# Patient Record
Sex: MALE | Race: AMERICAN INDIAN OR ALASKA NATIVE
[De-identification: names, ages, dates, MRNs, and addresses within clinical notes are randomized per-mention and may not be internally consistent; named-entity substitution may affect disease eponyms.]

---

## 2017-10-17 ENCOUNTER — HOSPITAL ENCOUNTER (EMERGENCY)
Dept: HOSPITAL 5 - ED | Age: 32
Discharge: HOME | End: 2017-10-17
Payer: SELF-PAY

## 2017-10-17 VITALS — DIASTOLIC BLOOD PRESSURE: 72 MMHG | SYSTOLIC BLOOD PRESSURE: 111 MMHG

## 2017-10-17 DIAGNOSIS — Z21: ICD-10-CM

## 2017-10-17 DIAGNOSIS — N39.0: Primary | ICD-10-CM

## 2017-10-17 DIAGNOSIS — F17.200: ICD-10-CM

## 2017-10-17 LAB
BILIRUB UR QL STRIP: (no result)
BLOOD UR QL VISUAL: (no result)
KETONES UR STRIP-MCNC: (no result) MG/DL
LEUKOCYTE ESTERASE UR QL STRIP: (no result)
MUCOUS THREADS #/AREA URNS HPF: (no result) /HPF
NITRITE UR QL STRIP: (no result)
PH UR STRIP: 5 [PH] (ref 5–7)
PROT UR STRIP-MCNC: (no result) MG/DL
RBC #/AREA URNS HPF: 7 /HPF (ref 0–6)
UROBILINOGEN UR-MCNC: 2 MG/DL (ref ?–2)
WBC #/AREA URNS HPF: 19 /HPF (ref 0–6)

## 2017-10-17 PROCEDURE — 99283 EMERGENCY DEPT VISIT LOW MDM: CPT

## 2017-10-17 PROCEDURE — 81001 URINALYSIS AUTO W/SCOPE: CPT

## 2017-10-17 NOTE — EMERGENCY DEPARTMENT REPORT
ED Male  HPI





- General


Chief complaint: Urogenital-Male


Stated complaint: URO MALE


Time Seen by Provider: 10/17/17 17:11


Source: patient


Mode of arrival: Ambulatory


Limitations: No Limitations





- History of Present Illness


Initial comments: 





This is a 32-year-old male nontoxic, well nourished in appearance, no acute 

signs of distress presents to the ED complaining of dysuria and polyuria 2 

days. Patient denies hematuria, back pain, headache, stiff neck, chest pain, 

shortness of breathe, fever, chills, n/v, numbness, or tingling. Patient denies 

any penile discharge or testicular pain. Denies any allergies. PMH includes 

HIV. Patient stated he does follow-up with a infection disease doctor and is 

currently having normal CD4 count.


MD Complaint: dysuria


-: Gradual, days(s) (2)


Radiation: none


Severity: mild


Severity scale (0 -10): 4


Quality: burning


Consistency: constant


Improves with: none


Worsens with: urination


dysuria.  denies: discharge, swelling, mass, rash, urinary retention, blood in 

urine, fever, nausea/vomiting, incontinence





- Related Data


 Previous Rx's











 Medication  Instructions  Recorded  Last Taken  Type


 


Levofloxacin [Levaquin TAB] 750 mg PO QDAY #7 tablet 10/17/17 Unknown Rx











 Allergies











Allergy/AdvReac Type Severity Reaction Status Date / Time


 


No Known Allergies Allergy   Unverified 08/16/16 20:45














ED Review of Systems


ROS: 


Stated complaint: URO MALE


Other details as noted in HPI





Constitutional: denies: chills, fever


Eyes: denies: eye pain, eye discharge, vision change


ENT: denies: ear pain, throat pain


Respiratory: denies: cough, shortness of breath, wheezing


Cardiovascular: denies: chest pain, palpitations


Endocrine: no symptoms reported


Gastrointestinal: denies: abdominal pain, nausea, diarrhea


Genitourinary: dysuria, frequency.  denies: urgency


Musculoskeletal: denies: back pain, joint swelling, arthralgia


Skin: denies: rash, lesions


Neurological: denies: headache, weakness, paresthesias


Psychiatric: denies: anxiety, depression


Hematological/Lymphatic: denies: easy bleeding, easy bruising





ED Past Medical Hx





- Past Medical History


Previous Medical History?: Yes


Hx HIV: Yes





- Surgical History


Past Surgical History?: No





- Social History


Smoking Status: Current Every Day Smoker


Substance Use Type: None





- Medications


Home Medications: 


 Home Medications











 Medication  Instructions  Recorded  Confirmed  Last Taken  Type


 


Levofloxacin [Levaquin TAB] 750 mg PO QDAY #7 tablet 10/17/17  Unknown Rx














ED Physical Exam





- General


Limitations: No Limitations


General appearance: alert, in no apparent distress





- Head


Head exam: Present: atraumatic, normocephalic, normal inspection





- Eye


Eye exam: Present: normal appearance, PERRL, EOMI.  Absent: scleral icterus, 

conjunctival injection, nystagmus, periorbital swelling, periorbital tenderness


Pupils: Present: normal accommodation





- ENT


ENT exam: Present: normal exam, normal orophraynx, mucous membranes moist, TM's 

normal bilaterally, normal external ear exam





- Neck


Neck exam: Present: normal inspection, full ROM.  Absent: tenderness, 

meningismus, lymphadenopathy, thyromegaly





- Respiratory


Respiratory exam: Present: normal lung sounds bilaterally.  Absent: respiratory 

distress, wheezes, rales, rhonchi, stridor, chest wall tenderness, accessory 

muscle use, decreased breath sounds, prolonged expiratory





- Cardiovascular


Cardiovascular Exam: Present: regular rate, normal rhythm, normal heart sounds.

  Absent: bradycardia, tachycardia, irregular rhythm, systolic murmur, 

diastolic murmur, rubs, gallop





- GI/Abdominal


GI/Abdominal exam: Present: soft, normal bowel sounds.  Absent: distended, 

tenderness, guarding, rebound, rigid, diminished bowel sounds





- Rectal


Rectal exam: Present: deferred





- 


 exam: Present: normal inspection.  Absent: testicular tenderness, urethral 

discharge, scrotal swelling, vertical testicular lie


External exam: Present: normal external exam.  Absent: erythema, swelling, 

lesions, lacerations, ecchymosis, bleeding





- Extremities Exam


Extremities exam: Present: normal inspection, full ROM, normal capillary 

refill.  Absent: tenderness, pedal edema, joint swelling, calf tenderness





- Back Exam


Back exam: Present: normal inspection, full ROM.  Absent: tenderness, CVA 

tenderness (R), CVA tenderness (L), muscle spasm, paraspinal tenderness, 

vertebral tenderness, rash noted





- Neurological Exam


Neurological exam: Present: alert, oriented X3, CN II-XII intact, normal gait, 

reflexes normal





- Psychiatric


Psychiatric exam: Present: normal affect, normal mood





- Skin


Skin exam: Present: warm, dry, intact, normal color.  Absent: rash





ED Course





 Vital Signs











  10/17/17





  16:03


 


Temperature 98.6 F


 


Pulse Rate 62


 


Respiratory 16





Rate 


 


Blood Pressure 110/84


 


O2 Sat by Pulse 99





Oximetry 














- Reevaluation(s)


Reevaluation #1: 





10/17/17 18:12


Patient is speaking in full sentences with no signs of distress noted.





ED Medical Decision Making





- Medical Decision Making





32-year-old male that presents with UTI.  Patient is stable and was examined by 

me. There is no CVA tenderness or signs/symptoms of pyelonephritis.  Due to 

patient having immunocompromised HIV patient received levofloxacin 750 mg daily 

for 7 days. Patient was instructed to follow-up with a primary care doctor in 3-

5 days or if symptoms worsen and continue return to emergency room as soon as 

possible possible. Patient is hemodynamically  stable with stable vital signs. 

Patient states he is feeling better.  At time time of discharge, the patient 

does not seem toxic or ill in appearance.  No acute signs of distress noted.  

Patient agrees to discharge treatment plan of care.  No further questions noted 

by the patient.


Critical care attestation.: 


If time is entered above; I have spent that time in minutes in the direct care 

of this critically ill patient, excluding procedure time.








ED Disposition


Clinical Impression: 


UTI (urinary tract infection)


Qualifiers:


 Urinary tract infection type: site unspecified Hematuria presence: without 

hematuria Qualified Code(s): N39.0 - Urinary tract infection, site not specified





Disposition: DC-01 TO HOME OR SELFCARE


Is pt being admited?: No


Does the pt Need Aspirin: No


Condition: Stable


Instructions:  Urinary Tract Infection in Men (ED), Levofloxacin (By mouth)


Additional Instructions: 


Follow-up with a primary care doctor in 3-5 days or if symptoms worsen and 

continue return to emergency room as soon as possible possible.


Prescriptions: 


Levofloxacin [Levaquin TAB] 750 mg PO QDAY #7 tablet


Referrals: 


PILAR NARVAEZ MD [Primary Care Provider] - 3-5 Days


ELIZABETH NIX MD [Staff Physician] - 3-5 Days


[facility] [Outside] - 3-5 Days


[facility] [Outside] - 3-5 Days


Forms:  Work/School Release Form(ED)

## 2018-03-10 ENCOUNTER — HOSPITAL ENCOUNTER (EMERGENCY)
Dept: HOSPITAL 5 - ED | Age: 33
Discharge: HOME | End: 2018-03-10
Payer: SELF-PAY

## 2018-03-10 VITALS — DIASTOLIC BLOOD PRESSURE: 71 MMHG | SYSTOLIC BLOOD PRESSURE: 112 MMHG

## 2018-03-10 DIAGNOSIS — R42: ICD-10-CM

## 2018-03-10 DIAGNOSIS — R11.2: ICD-10-CM

## 2018-03-10 DIAGNOSIS — R10.9: Primary | ICD-10-CM

## 2018-03-10 LAB
ALBUMIN SERPL-MCNC: 4.4 G/DL (ref 3.9–5)
ALT SERPL-CCNC: 16 UNITS/L (ref 7–56)
BILIRUB UR QL STRIP: (no result)
BLOOD UR QL VISUAL: (no result)
BUN SERPL-MCNC: 14 MG/DL (ref 9–20)
BUN/CREAT SERPL: 13 %
CALCIUM SERPL-MCNC: 9 MG/DL (ref 8.4–10.2)
HCT VFR BLD CALC: 44.9 % (ref 35.5–45.6)
HEMOLYSIS INDEX: 14
HGB BLD-MCNC: 14.4 GM/DL (ref 11.8–15.2)
INR PPP: 0.99 (ref 0.87–1.13)
MCH RBC QN AUTO: 31 PG (ref 28–32)
MCHC RBC AUTO-ENTMCNC: 32 % (ref 32–34)
MCV RBC AUTO: 96 FL (ref 84–94)
MUCOUS THREADS #/AREA URNS HPF: (no result) /HPF
PH UR STRIP: 6 [PH] (ref 5–7)
PLATELET # BLD: 224 K/MM3 (ref 140–440)
PROT UR STRIP-MCNC: (no result) MG/DL
RBC # BLD AUTO: 4.7 M/MM3 (ref 3.65–5.03)
RBC #/AREA URNS HPF: 2 /HPF (ref 0–6)
UROBILINOGEN UR-MCNC: 4 MG/DL (ref ?–2)
WBC #/AREA URNS HPF: 1 /HPF (ref 0–6)

## 2018-03-10 PROCEDURE — 99284 EMERGENCY DEPT VISIT MOD MDM: CPT

## 2018-03-10 PROCEDURE — 93005 ELECTROCARDIOGRAM TRACING: CPT

## 2018-03-10 PROCEDURE — 81001 URINALYSIS AUTO W/SCOPE: CPT

## 2018-03-10 PROCEDURE — 74177 CT ABD & PELVIS W/CONTRAST: CPT

## 2018-03-10 PROCEDURE — 87591 N.GONORRHOEAE DNA AMP PROB: CPT

## 2018-03-10 PROCEDURE — 85027 COMPLETE CBC AUTOMATED: CPT

## 2018-03-10 PROCEDURE — 85610 PROTHROMBIN TIME: CPT

## 2018-03-10 PROCEDURE — 36415 COLL VENOUS BLD VENIPUNCTURE: CPT

## 2018-03-10 PROCEDURE — 96374 THER/PROPH/DIAG INJ IV PUSH: CPT

## 2018-03-10 PROCEDURE — 93010 ELECTROCARDIOGRAM REPORT: CPT

## 2018-03-10 PROCEDURE — 96375 TX/PRO/DX INJ NEW DRUG ADDON: CPT

## 2018-03-10 PROCEDURE — 96361 HYDRATE IV INFUSION ADD-ON: CPT

## 2018-03-10 PROCEDURE — 80053 COMPREHEN METABOLIC PANEL: CPT

## 2018-03-10 NOTE — CAT SCAN REPORT
FINAL REPORT



PROCEDURE:  CT ABDOMEN PELVIS W CON



TECHNIQUE:  Computerized axial tomography of the abdomen and

pelvis was performed after the IV injection of iodinated nonionic

contrast. 



HISTORY:  rlq pain, IV and oral contrast 



COMPARISON:  No prior studies are available for comparison.



FINDINGS:  

Lower Lung fields: Lung bases are suboptimally visualized due to

breathing motion artifact. There appears to be a small amount of

peripheral emphysematous change present.



Upper Abdomen: No focal liver lesions are identified.

Intrahepatic ducts are not distended. The gallbladder showed no

abnormalities. The adrenal glands, the pancreas and the spleen

are unremarkable. 



Kidneys, Ureters and Urinary bladder: There are few low-density

nodules in the periphery of the renal cortex measuring under 5

millimeters which are difficult to characterize given their small

size although appear to represent renal cortical cysts. Kidneys

ureters and urinary bladder otherwise are unremarkable. 



Retroperitoneum: Abdominal aorta appears normal.



Nonspecific subcentimeter lymph nodes are seen in the

retroperitoneum. No pathologically enlarged lymph nodes are

identified. 



Bowel: Bowel loops are unremarkable. No evidence of bowel

obstruction ascites or free intraperitoneal gas. The cecum

projects into right side of the pelvis. The appendix appears to

be visualized in the mid pelvis to the right of midline and is

unremarkable. 



Reproductive organs: Prostate gland does not appear to be

enlarged.



Other: No acute bony abnormalities are identified.



IMPRESSION:  

Small peripheral renal cortical cysts appear to be visualized.



No other abnormalities are identified.



No abnormalities are seen in the right lower quadrant. The

appendix does not appear to be inflamed.

## 2018-03-10 NOTE — EMERGENCY DEPARTMENT REPORT
ED Chest Pain HPI





- General


Chief Complaint: Nausea/Vomiting/Diarrhea


Stated Complaint: ABDOMINAL PAIN/VOMITING


Time Seen by Provider: 03/10/18 17:40


Source: patient


Mode of arrival: Ambulatory


Limitations: No Limitations





- History of Present Illness


MD Complaint: chest pain





- Related Data


 Previous Rx's











 Medication  Instructions  Recorded  Last Taken  Type


 


Levofloxacin [Levaquin TAB] 750 mg PO QDAY #7 tablet 10/17/17 Unknown Rx











 Allergies











Allergy/AdvReac Type Severity Reaction Status Date / Time


 


No Known Allergies Allergy   Unverified 08/16/16 20:45














ED Review of Systems


ROS: 


Stated complaint: ABDOMINAL PAIN/VOMITING


Other details as noted in HPI








ED Past Medical Hx





- Past Medical History


Hx HIV: Yes





- Social History


Smoking Status: Never Smoker


Substance Use Type: None





- Medications


Home Medications: 


 Home Medications











 Medication  Instructions  Recorded  Confirmed  Last Taken  Type


 


Levofloxacin [Levaquin TAB] 750 mg PO QDAY #7 tablet 10/17/17  Unknown Rx














ED Physical Exam





- General


Limitations: No Limitations





ED Course





 Vital Signs











  03/10/18





  17:15


 


Temperature 97.1 F L


 


Pulse Rate 66


 


Respiratory 16





Rate 


 


Blood Pressure 113/69


 


O2 Sat by Pulse 98





Oximetry 











Critical care attestation.: 


If time is entered above; I have spent that time in minutes in the direct care 

of this critically ill patient, excluding procedure time.








ED Disposition


Condition: Stable


Referrals: 


PRIMARY CARE,MD [Primary Care Provider] - 3-5 Days

## 2018-03-10 NOTE — EMERGENCY DEPARTMENT REPORT
HPI





- General


Chief Complaint: Nausea/Vomiting/Diarrhea


Time Seen by Provider: 03/10/18 17:40





- HPI


HPI: 





32-year-old -American male with a past medical history of HIV comes in 

today for complaint of abdominal pain and vomiting.  Patient reports that the 

pain and vomiting started today while at work.  Patient reports that the pain 

is suprapubic and it burns at times.  Patient reported that he vomited 2 while 

at work.  He reported that he was lightheaded.  Patient reports that he turned 

quickly he had dizziness and lightheadedness.  This was 2 times.  Patient 

denies any fever no chills no dysuria no penile discharge.  Patient is followed 

by [facility] in [place].  He has had no opportunistic infections he takes 

his medications on a daily basis.  He has had no recent hospitalizations.  He 

does not travel outside the country the last 30 days.





ED Past Medical Hx





- Past Medical History


Hx HIV: Yes





- Social History


Smoking Status: Never Smoker


Substance Use Type: None





- Medications


Home Medications: 


 Home Medications











 Medication  Instructions  Recorded  Confirmed  Last Taken  Type


 


Levofloxacin [Levaquin TAB] 750 mg PO QDAY #7 tablet 10/17/17  Unknown Rx


 


Ibuprofen 600 mg PO Q8H PRN #15 tablet 03/10/18  Unknown Rx














ED Review of Systems


ROS: 


Stated complaint: ABDOMINAL PAIN/VOMITING


Other details as noted in HPI





Constitutional: chills


Eyes: denies: eye pain, eye discharge, vision change


ENT: denies: ear pain, throat pain


Respiratory: denies: cough, shortness of breath, wheezing


Cardiovascular: denies: chest pain, palpitations


Endocrine: no symptoms reported


Gastrointestinal: abdominal pain, nausea, vomiting (2).  denies: diarrhea, 

constipation


Genitourinary: denies: dysuria, hematuria, discharge, testicular pain, 

testicular mass


Musculoskeletal: denies: back pain, joint swelling, arthralgia


Skin: denies: rash, lesions


Neurological: other (dizziness).  denies: headache, weakness, paresthesias


Psychiatric: denies: anxiety, depression


Hematological/Lymphatic: denies: easy bleeding, easy bruising





Physical Exam





- Physical Exam


Vital Signs: 


 Vital Signs











  03/10/18 03/10/18





  17:15 19:57


 


Temperature 97.1 F L 


 


Pulse Rate 66 


 


Respiratory 16 18





Rate  


 


Blood Pressure 113/69 


 


O2 Sat by Pulse 98 





Oximetry  











Physical Exam: 





GENERAL APPEARANCE: Well developed, well nourished, in no acute distress.


SKIN: Inspection of the skin reveals no rashes, ulcerations or petechiae.


HEENT: The sclerae were anicteric and conjunctivae were pink and moist. 

Extraocular movements were intact and pupils were equal, round, and reactive to 

light with normal accommodation. External inspection of the ears and nose 

showed no scars, lesions, or masses. Lips, teeth, and gums showed normal 

mucosa. The oral mucosa, hard and soft palate, tongue and posterior pharynx 

were normal.


NECK: Supple and symmetric. There was no thyroid enlargement, and no tenderness

, or masses were felt.


CHEST: Normal AP diameter and normal contour without any kyphoscoliosis.


LUNGS: Auscultation of the lungs revealed normal breath sounds without any 

other adventitious sounds or rubs.


CARDIOVASCULAR: There was a regular rate and rhythm without any murmurs, gallops

, rubs. The carotid pulses were normal and 2+ bilaterally without bruits. 

Peripheral pulses were 2+ and symmetric.


ABDOMEN: Soft and tender suprapubic with normal bowel sounds.  The spleen was 

not palpable. There were no inguinal or umbilical hernias noted. No ascites was 

noted.


: Circumcised, no testicular masses or testicular pain no penile discharge 

there is bilateral lymphadenopathy.  Suprapubic tenderness at the symphysis.


LYMPH NODES: No lymphadenopathy was appreciated in the neck and  axillae.  

Bilateral lymphadenopathy groin. 


MUSCULOSKELETAL: Gait was normal. There was no tenderness or effusions noted. 

Muscle strength and tone were normal.


EXTREMITIES: No cyanosis, clubbing or edema.


NEUROLOGIC: Alert and oriented x 3. Normal affect. Gait was normal. Sensation 

to touch was normal. 








ED Course


 Vital Signs











  03/10/18 03/10/18





  17:15 19:57


 


Temperature 97.1 F L 


 


Pulse Rate 66 


 


Respiratory 16 18





Rate  


 


Blood Pressure 113/69 


 


O2 Sat by Pulse 98 





Oximetry  














ED Medical Decision Making





- Lab Data


Result diagrams: 


 03/10/18 18:21





 03/10/18 18:21





- Medical Decision Making





Patient has been evaluated by this provider as well as  and fast 

track.  CBC CMP coags urinalysis EKG and CT of the abdomen with contrast has 

been ordered.  Patient also had Toradol and Zofran normal saline.  Patient 

reports that he feels much better after having pain medication.  CT showed just 

some possible renal cortex cysts.  Appendix is visualized with no.  Fluid or 

enlargement.  If urinalysis comes back negative and EKG comes back negative.  

We will treat patient for Chlamydia  azithromycin as this can also explain why 

patient has suprapubic pain.  We will discharge patient home for him to follow 

up with his primary care provider.  Patient verbalized understanding


Critical care attestation.: 


If time is entered above; I have spent that time in minutes in the direct care 

of this critically ill patient, excluding procedure time.








ED Disposition


Clinical Impression: 


 Abdominal pain in male





Disposition: DC-01 TO HOME OR SELFCARE


Is pt being admited?: No


Does the pt Need Aspirin: No


Condition: Stable


Instructions:  Acute Abdominal Pain (ED)


Additional Instructions: 


Please follow up with your primary care provider for further evaluation.


Prescriptions: 


Ibuprofen 600 mg PO Q8H PRN #15 tablet


 PRN Reason: Pain


Referrals: 


PRIMARY CARE,MD [Primary Care Provider] - 3-5 Days


Forms:  Work/School Release Form(ED), Accompanied Note

## 2018-03-10 NOTE — EVENT NOTE
Date: 03/10/18


Medical screening examination: 5-year-old male presenting with lower abdominal 

pain, nausea vomiting and lightheadedness.  Denies testicular pain.  The tender 

in the lower abdominal region.  Check labs, EKG, CT scan of the abdomen and 

pelvis, patient will also require a testicular exam.  Denies irritative, 

obstructive urinary symptoms.


 Vital Signs











  03/10/18





  17:15


 


Temperature 97.1 F L


 


Pulse Rate 66


 


Respiratory 16





Rate 


 


Blood Pressure 113/69


 


O2 Sat by Pulse 98





Oximetry

## 2018-04-16 ENCOUNTER — HOSPITAL ENCOUNTER (EMERGENCY)
Dept: HOSPITAL 5 - ED | Age: 33
Discharge: HOME | End: 2018-04-16
Payer: COMMERCIAL

## 2018-04-16 VITALS — SYSTOLIC BLOOD PRESSURE: 124 MMHG | DIASTOLIC BLOOD PRESSURE: 72 MMHG

## 2018-04-16 DIAGNOSIS — R13.10: ICD-10-CM
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
## 2018-04-16 DIAGNOSIS — S10.91XA: Primary | ICD-10-CM
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
## 2018-04-16 LAB
ALBUMIN SERPL-MCNC: 4.3 G/DL (ref 3.9–5)
ALT SERPL-CCNC: 13 UNITS/L (ref 7–56)
BASOPHILS # (AUTO): 0 K/MM3 (ref 0–0.1)
BASOPHILS NFR BLD AUTO: 0.4 % (ref 0–1.8)
BUN SERPL-MCNC: 10 MG/DL (ref 9–20)
BUN/CREAT SERPL: 10 %
CALCIUM SERPL-MCNC: 8.8 MG/DL (ref 8.4–10.2)
EOSINOPHIL # BLD AUTO: 0.1 K/MM3 (ref 0–0.4)
EOSINOPHIL NFR BLD AUTO: 0.7 % (ref 0–4.3)
HCT VFR BLD CALC: 45.2 % (ref 35.5–45.6)
HEMOLYSIS INDEX: 13
HGB BLD-MCNC: 15.1 GM/DL (ref 11.8–15.2)
LIPASE SERPL-CCNC: 20 UNITS/L (ref 13–60)
LYMPHOCYTES # BLD AUTO: 1.9 K/MM3 (ref 1.2–5.4)
LYMPHOCYTES NFR BLD AUTO: 21.4 % (ref 13.4–35)
MCH RBC QN AUTO: 31 PG (ref 28–32)
MCHC RBC AUTO-ENTMCNC: 33 % (ref 32–34)
MCV RBC AUTO: 94 FL (ref 84–94)
MONOCYTES # (AUTO): 0.6 K/MM3 (ref 0–0.8)
MONOCYTES % (AUTO): 6.6 % (ref 0–7.3)
PLATELET # BLD: 214 K/MM3 (ref 140–440)
RBC # BLD AUTO: 4.81 M/MM3 (ref 3.65–5.03)

## 2018-04-16 PROCEDURE — 70491 CT SOFT TISSUE NECK W/DYE: CPT

## 2018-04-16 PROCEDURE — 83690 ASSAY OF LIPASE: CPT

## 2018-04-16 PROCEDURE — 85025 COMPLETE CBC W/AUTO DIFF WBC: CPT

## 2018-04-16 PROCEDURE — 80053 COMPREHEN METABOLIC PANEL: CPT

## 2018-04-16 PROCEDURE — 36415 COLL VENOUS BLD VENIPUNCTURE: CPT

## 2018-04-16 PROCEDURE — 99284 EMERGENCY DEPT VISIT MOD MDM: CPT
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
